# Patient Record
Sex: MALE | URBAN - NONMETROPOLITAN AREA
[De-identification: names, ages, dates, MRNs, and addresses within clinical notes are randomized per-mention and may not be internally consistent; named-entity substitution may affect disease eponyms.]

---

## 2024-09-09 PROCEDURE — 93306 TTE W/DOPPLER COMPLETE: CPT | Performed by: INTERNAL MEDICINE

## 2024-09-10 ENCOUNTER — DOCUMENTATION (OUTPATIENT)
Dept: CARDIOLOGY | Facility: CLINIC | Age: 69
End: 2024-09-10
Payer: MEDICARE

## 2024-09-11 ENCOUNTER — PATIENT OUTREACH (OUTPATIENT)
Dept: CARDIOLOGY | Facility: CLINIC | Age: 69
End: 2024-09-11
Payer: MEDICARE

## 2024-09-11 RX ORDER — NITROGLYCERIN 0.4 MG/1
0.4 TABLET SUBLINGUAL EVERY 5 MIN PRN
COMMUNITY

## 2024-09-11 RX ORDER — ATORVASTATIN CALCIUM 80 MG/1
80 TABLET, FILM COATED ORAL DAILY
COMMUNITY

## 2024-09-11 RX ORDER — ASPIRIN 81 MG/1
81 TABLET ORAL DAILY
COMMUNITY

## 2024-09-11 RX ORDER — CARVEDILOL 12.5 MG/1
12.5 TABLET ORAL 2 TIMES DAILY
COMMUNITY

## 2024-09-11 NOTE — PROGRESS NOTES
Discharge Facility: [facility]  Discharge Diagnosis:  STEMI  Admission Date: 9/8/24  Discharge Date:  9/10/24    PCP Appointment Date: Pt is establishing with a PCP  Specialist Appointment Date:   SEP 17  2024 02:30 PM - Cardiology Hospital Discharge  [facility] - Kana Restrepo DO     Hospital Encounter and Summary Linked: No  See discharge assessment below for further details     Engagement  Call Start Time: 1402 (9/11/2024  2:09 PM)    Medications  Prescription Comments: Brilinta 90mg BID, Atorvastatin 80mg Qpm, carvedilol 12.5mg BID, ASA 81mg81, NTG 0.4 (9/11/2024  2:09 PM)  Medication Comments: n/a (9/11/2024  2:09 PM)    Appointments  Does the patient have a primary care provider?: No (patient is currently working on establishing one and filling out paperwork) (9/11/2024  2:09 PM)    Self Management  What Durable Medical Equipment (DME) was ordered?: n/a (9/11/2024  2:09 PM)    Patient Teaching  Does the patient have access to their discharge instructions?: Yes (9/11/2024  2:09 PM)  Care Management Interventions: Reviewed instructions with patient (9/11/2024  2:09 PM)  What is the patient's perception of their health status since discharge?: Improving (9/11/2024  2:09 PM)  Is the patient/caregiver able to teach back the hierarchy of who to call/visit for symptoms/problems? PCP, Specialist, Home Health nurse, Urgent Care, ED, 911: Yes (9/11/2024  2:09 PM)  Patient/Caregiver Education Comments: Reviewed care and monitoring of groin site. REviewed importance of having a PCP, Reviewed new medications (9/11/2024  2:09 PM)    Wrap Up  Wrap Up Additional Comments: Patient denies any CP at this time. Reviewed new medications patient has an understanding of indication of medications. Patient states groin site is without drainage or increased redness, swelling or pain. REviewed upcoming appt with Dr Restrepo. This CM gives contact information for non urgent matters (9/11/2024  2:09 PM)

## 2024-09-17 ENCOUNTER — APPOINTMENT (OUTPATIENT)
Dept: CARDIOLOGY | Facility: CLINIC | Age: 69
End: 2024-09-17
Payer: MEDICARE

## 2024-09-18 ENCOUNTER — DOCUMENTATION (OUTPATIENT)
Dept: CARDIOLOGY | Facility: CLINIC | Age: 69
End: 2024-09-18
Payer: MEDICARE

## 2024-09-18 ENCOUNTER — TELEPHONE (OUTPATIENT)
Dept: CARDIOLOGY | Facility: CLINIC | Age: 69
End: 2024-09-18
Payer: MEDICARE

## 2024-09-18 DIAGNOSIS — Z98.890 S/P PERICARDIOCENTESIS: ICD-10-CM

## 2024-09-18 NOTE — TELEPHONE ENCOUNTER
"Patient seen on Cardiac Consult at [facility] on 9/16/2024; Reason for Consult: Hypotension, recent inferior STEMI, acute renal failure, cardiac tamponade.    ASSESSMENT:  Mr. Jones is a 68 year old male seen in interventional cardiology consultation at the request of hospitalist and patient who presented yesterday with shortness of breath, hypotension, questionable \"heart failure\" with normal BNP and normal chest x-ray with evidence of cardiomegaly on chest x-ray.    Patient became more hypotensive earlier today, there was concern for sepsis, he is transferred to the ICU and initiated on antibiotics, echocardiogram was performed revealing large circumferential pericardial effusion with RV collapse and moderately severe respiratory distress and hypotension.    Based on the above, emergent arrangements were made to proceed with emergent pericardiocentesis.    PLAN:  Proceed with emergent pericardiocentesis  Hold diuretics and losartan  Continue with nephrology consultation  Consider colchicine if nephrology approves    UPDATE: 9/18/2024  Mr. Jones is a 67 y/o M with pericardial effusion s/p pericardiocentesis on 9/16. High suspicion for Dressler syndrome. Patient's symptoms, DINORAH and blood pressure have all show significant improvement following pericardiocentesis. No additional output from pericardial drain in the past 24 hours, drain removed. On colchicine for pericarditis, will not start ibuprofen as patient is on antiplatelet therapy. Patient is appropriate for discharge today.    Plan:  1. Appropriate for discharge today from a cardiology standpoint  2. Continue Colchicine 0.6 mg daily for pericarditis  3. Continue other home medications as prescribed  4. Repeat Limited Echo prior to TCM follow up.    "

## 2024-09-19 ENCOUNTER — DOCUMENTATION (OUTPATIENT)
Dept: CARDIOLOGY | Facility: CLINIC | Age: 69
End: 2024-09-19
Payer: MEDICARE

## 2024-09-19 ENCOUNTER — PATIENT OUTREACH (OUTPATIENT)
Dept: CARDIOLOGY | Facility: CLINIC | Age: 69
End: 2024-09-19
Payer: MEDICARE

## 2024-09-19 RX ORDER — COLCHICINE 0.6 MG/1
0.6 TABLET ORAL DAILY
COMMUNITY

## 2024-09-19 NOTE — PROGRESS NOTES
Discharge Facility[facility]  [facility]  Discharge Diagnosis: Acute cardiac tamponade  Admission Date: 9/14/24  Discharge Date: 9/18/24    PCP Appointment Date: TBD  Specialist Appointment Date:   SEP 25  2024  10:20 AM - Cardiology Clinic Visit  [facility] - Kana Restrepo DO     Hospital Encounter and Summary Linked: No    Engagement  Call Start Time: 1027 (Patient returned call) (9/19/2024 10:45 AM)    Medications  Medications reviewed with patient/caregiver?: Yes (9/19/2024 10:45 AM)  Is the patient having any side effects they believe may be caused by any medication additions or changes?: No (9/19/2024 10:45 AM)  Does the patient have all medications ordered at discharge?: Yes (9/19/2024 10:45 AM)  Prescription Comments: Colchicine (9/19/2024 10:45 AM)  Is the patient taking all medications as directed (includes completed medication regime)?: Yes (9/19/2024 10:45 AM)  Medication Comments: Medication req not avaible (9/19/2024 10:45 AM)    Appointments  Does the patient have a primary care provider?: Yes (9/19/2024 10:45 AM)  Care Management Interventions: Advised patient to make appointment (9/19/2024 10:45 AM)  Care Management Interventions: Advised patient to keep appointment (9/19/2024 10:45 AM)    Self Management  What is the home health agency?: n/a (9/19/2024 10:45 AM)  What Durable Medical Equipment (DME) was ordered?: n/a (9/19/2024 10:45 AM)    Patient Teaching  Does the patient have access to their discharge instructions?: Yes (9/19/2024 10:45 AM)  Care Management Interventions: Reviewed instructions with patient (9/19/2024 10:45 AM)  What is the patient's perception of their health status since discharge?: Improving (9/19/2024 10:45 AM)  Is the patient/caregiver able to teach back the hierarchy of who to call/visit for symptoms/problems? PCP, Specialist, Home Health nurse, Urgent Care, ED, 911: Yes (9/19/2024 10:45 AM)  Patient/Caregiver Education Comments: Reivewed care of drain site. Reviewed  medication (9/19/2024 10:45 AM)    Wrap Up  Wrap Up Additional Comments: Patient denies CP or SOB. Patient states he can lay flat without difficulties. Reviewed medication colchicine and patient has an understanding of indication. Reviewed upcoming appt with Dr Meraz. Patient states he continues with dressing to drain site and will remove later today, but he denies any concerns with the site at this time. This CM gives contact information for non urgent matters. (9/19/2024 10:45 AM)

## 2024-09-24 ENCOUNTER — HOSPITAL ENCOUNTER (OUTPATIENT)
Dept: CARDIOLOGY | Facility: CLINIC | Age: 69
Discharge: HOME | End: 2024-09-24
Payer: MEDICARE

## 2024-09-24 DIAGNOSIS — Z98.890 S/P PERICARDIOCENTESIS: ICD-10-CM

## 2024-09-24 LAB
EJECTION FRACTION APICAL 4 CHAMBER: 22.2
EJECTION FRACTION: 40 %
LEFT VENTRICLE INTERNAL DIMENSION DIASTOLE: 6.62 CM (ref 3.5–6)
LEFT VENTRICULAR OUTFLOW TRACT DIAMETER: 2.7 CM
LV EJECTION FRACTION BIPLANE: 27 %

## 2024-09-24 PROCEDURE — 93308 TTE F-UP OR LMTD: CPT

## 2024-09-24 PROCEDURE — 93308 TTE F-UP OR LMTD: CPT | Performed by: INTERNAL MEDICINE

## 2024-09-25 ENCOUNTER — APPOINTMENT (OUTPATIENT)
Dept: CARDIOLOGY | Facility: CLINIC | Age: 69
End: 2024-09-25
Payer: MEDICARE

## 2024-09-25 VITALS
SYSTOLIC BLOOD PRESSURE: 116 MMHG | HEART RATE: 84 BPM | HEIGHT: 68 IN | WEIGHT: 232 LBS | BODY MASS INDEX: 35.16 KG/M2 | DIASTOLIC BLOOD PRESSURE: 68 MMHG

## 2024-09-25 DIAGNOSIS — I31.39 PERICARDIAL EFFUSION (HHS-HCC): ICD-10-CM

## 2024-09-25 DIAGNOSIS — I25.41 ANEURYSM (ARTERIOVENOUS) OF CORONARY VESSELS: ICD-10-CM

## 2024-09-25 DIAGNOSIS — I25.10 ASHD (ARTERIOSCLEROTIC HEART DISEASE): ICD-10-CM

## 2024-09-25 DIAGNOSIS — E78.5 HYPERLIPIDEMIA, UNSPECIFIED HYPERLIPIDEMIA TYPE: ICD-10-CM

## 2024-09-25 DIAGNOSIS — I21.19 INFERIOR MYOCARDIAL INFARCTION (MULTI): ICD-10-CM

## 2024-09-25 DIAGNOSIS — Z87.891 FORMER SMOKER: ICD-10-CM

## 2024-09-25 DIAGNOSIS — I10 ESSENTIAL HYPERTENSION: ICD-10-CM

## 2024-09-25 DIAGNOSIS — I25.5 CARDIOMYOPATHY, ISCHEMIC: ICD-10-CM

## 2024-09-25 PROCEDURE — 1159F MED LIST DOCD IN RCRD: CPT | Performed by: INTERNAL MEDICINE

## 2024-09-25 PROCEDURE — 3078F DIAST BP <80 MM HG: CPT | Performed by: INTERNAL MEDICINE

## 2024-09-25 PROCEDURE — 3074F SYST BP LT 130 MM HG: CPT | Performed by: INTERNAL MEDICINE

## 2024-09-25 PROCEDURE — 1160F RVW MEDS BY RX/DR IN RCRD: CPT | Performed by: INTERNAL MEDICINE

## 2024-09-25 PROCEDURE — 99496 TRANSJ CARE MGMT HIGH F2F 7D: CPT | Performed by: INTERNAL MEDICINE

## 2024-09-25 RX ORDER — CLOPIDOGREL BISULFATE 75 MG/1
75 TABLET ORAL DAILY
COMMUNITY

## 2024-09-25 RX ORDER — LOSARTAN POTASSIUM 25 MG/1
12.5 TABLET ORAL DAILY
Qty: 45 TABLET | Refills: 3 | Status: SHIPPED | OUTPATIENT
Start: 2024-09-25 | End: 2025-09-25

## 2024-09-25 RX ORDER — METOPROLOL SUCCINATE 50 MG/1
50 TABLET, EXTENDED RELEASE ORAL DAILY
COMMUNITY

## 2024-09-25 NOTE — PATIENT INSTRUCTIONS
Please bring all medicines, vitamins, and herbal supplements with you when you come to the office.    Prescriptions will not be filled unless you are compliant with your follow up appointments or have a follow up appointment scheduled as per instruction of your physician. Refills should be requested at the time of your visit.     BMI was above normal measurement. Current weight: 105 kg (232 lb)  Weight change since last visit (-) denotes wt loss 232 lbs   Weight loss needed to achieve BMI 25: 67.9 Lbs  Weight loss needed to achieve BMI 30: 35.1 Lbs  Provided instructions on dietary changes  Provided instructions on exercise.    Get cardiac MRI    Start losartan 12.5

## 2024-09-25 NOTE — PROGRESS NOTES
"Subjective   Michael Jones is a 68 y.o. male       Chief Complaint    Follow-up          68-year-old gentleman returns for 7-day TCM follow-up visit following recent large inferior ST elevation MI treated with primary percutaneous intervention of the mid through distal RCA with 2 large 4 and 4.5 mm drug-eluting stents with moderate LV dysfunction.  This occurred on September 8 and then 1 week later was readmitted with cardiac tamponade and hypotension.  He underwent emergent pericardiocentesis, draining a total of 850 cc of bloody pericardial fluid with complete resolution.  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    He remains on appropriate therapies including colchicine which will be discontinued this coming weekend (for presumptive Dressler syndrome)    He denies any hypotension, angina, shortness of breath he has discontinued smoking altogether; his wife even states his breathing is greatly improved.    We reviewed his follow-up echocardiogram, all interventional reports and medications.    Recommendations, proceed with cardiac MR imaging to assess inferior wall and aneurysm and ordered for further decision making process in regards to possible referral to cardiothoracic surgery versus continued medical management, will add losartan 12.5 mg daily to his regimen, follow-up in the next 12 weeks         Review of Systems   All other systems reviewed and are negative.           Vitals:    09/25/24 1037   BP: 116/68   BP Location: Left arm   Patient Position: Sitting   Pulse: 84   Weight: 105 kg (232 lb)   Height: 1.727 m (5' 8\")        Objective   Physical Exam  Constitutional:       Appearance: Normal appearance.   HENT:      Nose: Nose normal.   Neck:      Vascular: No carotid bruit.   Cardiovascular:      Rate and Rhythm: Normal rate.      Pulses: Normal " pulses.      Heart sounds: Normal heart sounds.   Pulmonary:      Effort: Pulmonary effort is normal.   Abdominal:      General: Bowel sounds are normal.      Palpations: Abdomen is soft.   Musculoskeletal:         General: Normal range of motion.      Cervical back: Normal range of motion.      Right lower leg: No edema.      Left lower leg: No edema.   Skin:     General: Skin is warm and dry.   Neurological:      General: No focal deficit present.      Mental Status: He is alert.   Psychiatric:         Mood and Affect: Mood normal.         Behavior: Behavior normal.         Thought Content: Thought content normal.         Judgment: Judgment normal.         Allergies  Penicillins     Current Medications    Current Outpatient Medications:     aspirin 81 mg EC tablet, Take 1 tablet (81 mg) by mouth once daily., Disp: , Rfl:     atorvastatin (Lipitor) 80 mg tablet, Take 1 tablet (80 mg) by mouth once daily., Disp: , Rfl:     clopidogrel (Plavix) 75 mg tablet, Take 1 tablet (75 mg) by mouth once daily., Disp: , Rfl:     colchicine 0.6 mg tablet, Take 1 tablet (0.6 mg) by mouth once daily., Disp: , Rfl:     metoprolol succinate XL (Toprol-XL) 50 mg 24 hr tablet, Take 1 tablet (50 mg) by mouth once daily. Do not crush or chew., Disp: , Rfl:     nitroglycerin (Nitrostat) 0.4 mg SL tablet, Place 1 tablet (0.4 mg) under the tongue every 5 minutes if needed for chest pain., Disp: , Rfl:                      Assessment/Plan   1. ASHD (arteriosclerotic heart disease)        2. Cardiomyopathy, ischemic        3. Inferior myocardial infarction (Multi)        4. Aneurysm (arteriovenous) of coronary vessels        5. Hyperlipidemia, unspecified hyperlipidemia type        6. BMI 35.0-35.9,adult        7. Former smoker        8. Pericardial effusion ([facility])                 Scribe Attestation  By signing my name below, Rayna MCKEON RN   , Scribe   attest that this documentation has been prepared under the direction and in the  presence of Kana Restrepo DO.     Provider Attestation - Scribe documentation    All medical record entries made by the Scribe were at my direction and personally dictated by me. I have reviewed the chart and agree that the record accurately reflects my personal performance of the history, physical exam, discussion and plan.

## 2024-09-25 NOTE — LETTER
September 26, 2024     Mehul Torres DO  [address]  [place] [address]    Patient: Michael Jones   YOB: 1955   Date of Visit: 9/25/2024       Dear Dr. Mehul Torres DO:    Thank you for referring Michael Jones to me for evaluation. Below are my notes for this consultation.  If you have questions, please do not hesitate to call me. I look forward to following your patient along with you.       Sincerely,     Kana Restrepo DO      CC: No Recipients  ______________________________________________________________________________________    Subjective   Michael Jones is a 68 y.o. male       Chief Complaint    Follow-up          68-year-old gentleman returns for 7-day TCM follow-up visit following recent large inferior ST elevation MI treated with primary percutaneous intervention of the mid through distal RCA with 2 large 4 and 4.5 mm drug-eluting stents with moderate LV dysfunction.  This occurred on September 8 and then 1 week later was readmitted with cardiac tamponade and hypotension.  He underwent emergent pericardiocentesis, draining a total of 850 cc of bloody pericardial fluid with complete resolution.  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    He remains on appropriate therapies including colchicine which will be discontinued this coming weekend (for presumptive Dressler syndrome)    He denies any hypotension, angina, shortness of breath he has discontinued smoking altogether; his wife even states his breathing is greatly improved.    We reviewed his follow-up echocardiogram, all interventional reports and medications.    Recommendations, proceed with cardiac MR imaging to assess inferior wall and aneurysm and ordered for further decision making process in regards to possible referral to cardiothoracic surgery  "versus continued medical management, will add losartan 12.5 mg daily to his regimen, follow-up in the next 12 weeks         Review of Systems   All other systems reviewed and are negative.           Vitals:    09/25/24 1037   BP: 116/68   BP Location: Left arm   Patient Position: Sitting   Pulse: 84   Weight: 105 kg (232 lb)   Height: 1.727 m (5' 8\")        Objective   Physical Exam  Constitutional:       Appearance: Normal appearance.   HENT:      Nose: Nose normal.   Neck:      Vascular: No carotid bruit.   Cardiovascular:      Rate and Rhythm: Normal rate.      Pulses: Normal pulses.      Heart sounds: Normal heart sounds.   Pulmonary:      Effort: Pulmonary effort is normal.   Abdominal:      General: Bowel sounds are normal.      Palpations: Abdomen is soft.   Musculoskeletal:         General: Normal range of motion.      Cervical back: Normal range of motion.      Right lower leg: No edema.      Left lower leg: No edema.   Skin:     General: Skin is warm and dry.   Neurological:      General: No focal deficit present.      Mental Status: He is alert.   Psychiatric:         Mood and Affect: Mood normal.         Behavior: Behavior normal.         Thought Content: Thought content normal.         Judgment: Judgment normal.         Allergies  Penicillins     Current Medications    Current Outpatient Medications:   •  aspirin 81 mg EC tablet, Take 1 tablet (81 mg) by mouth once daily., Disp: , Rfl:   •  atorvastatin (Lipitor) 80 mg tablet, Take 1 tablet (80 mg) by mouth once daily., Disp: , Rfl:   •  clopidogrel (Plavix) 75 mg tablet, Take 1 tablet (75 mg) by mouth once daily., Disp: , Rfl:   •  colchicine 0.6 mg tablet, Take 1 tablet (0.6 mg) by mouth once daily., Disp: , Rfl:   •  metoprolol succinate XL (Toprol-XL) 50 mg 24 hr tablet, Take 1 tablet (50 mg) by mouth once daily. Do not crush or chew., Disp: , Rfl:   •  nitroglycerin (Nitrostat) 0.4 mg SL tablet, Place 1 tablet (0.4 mg) under the tongue every 5 " minutes if needed for chest pain., Disp: , Rfl:                      Assessment/Plan   1. ASHD (arteriosclerotic heart disease)        2. Cardiomyopathy, ischemic        3. Inferior myocardial infarction (Multi)        4. Aneurysm (arteriovenous) of coronary vessels        5. Hyperlipidemia, unspecified hyperlipidemia type        6. BMI 35.0-35.9,adult        7. Former smoker        8. Pericardial effusion ([facility])                 Scribe Attestation  By signing my name below, I, Rayna VALENZUELA RN   , Scribe   attest that this documentation has been prepared under the direction and in the presence of Kana Restrepo DO.     Provider Attestation - Scribe documentation    All medical record entries made by the Scribe were at my direction and personally dictated by me. I have reviewed the chart and agree that the record accurately reflects my personal performance of the history, physical exam, discussion and plan.

## 2024-10-08 ENCOUNTER — PATIENT OUTREACH (OUTPATIENT)
Dept: CARDIOLOGY | Facility: CLINIC | Age: 69
End: 2024-10-08
Payer: MEDICARE

## 2024-10-22 ENCOUNTER — HOSPITAL ENCOUNTER (OUTPATIENT)
Dept: RADIOLOGY | Facility: CLINIC | Age: 69
Discharge: HOME | End: 2024-10-22
Payer: MEDICARE

## 2024-10-22 DIAGNOSIS — I25.41 ANEURYSM (ARTERIOVENOUS) OF CORONARY VESSELS: ICD-10-CM

## 2024-10-22 DIAGNOSIS — I21.19 INFERIOR MYOCARDIAL INFARCTION (MULTI): ICD-10-CM

## 2024-10-22 DIAGNOSIS — I25.5 CARDIOMYOPATHY, ISCHEMIC: ICD-10-CM

## 2024-10-22 PROCEDURE — 75561 CARDIAC MRI FOR MORPH W/DYE: CPT

## 2024-10-22 PROCEDURE — 2550000001 HC RX 255 CONTRASTS: Performed by: INTERNAL MEDICINE

## 2024-10-22 PROCEDURE — A9575 INJ GADOTERATE MEGLUMI 0.1ML: HCPCS | Performed by: INTERNAL MEDICINE

## 2024-10-22 PROCEDURE — 75565 CARD MRI VELOC FLOW MAPPING: CPT

## 2024-10-22 RX ORDER — GADOTERATE MEGLUMINE 376.9 MG/ML
40 INJECTION INTRAVENOUS
Status: COMPLETED | OUTPATIENT
Start: 2024-10-22 | End: 2024-10-22

## 2024-11-05 ENCOUNTER — TELEPHONE (OUTPATIENT)
Dept: CARDIOLOGY | Facility: CLINIC | Age: 69
End: 2024-11-05
Payer: MEDICARE

## 2024-11-05 DIAGNOSIS — I31.39 PERICARDIAL EFFUSION (HHS-HCC): ICD-10-CM

## 2024-11-05 DIAGNOSIS — I25.41 ANEURYSM (ARTERIOVENOUS) OF CORONARY VESSELS: ICD-10-CM

## 2024-11-05 DIAGNOSIS — I21.19 INFERIOR MYOCARDIAL INFARCTION (MULTI): ICD-10-CM

## 2024-11-05 NOTE — TELEPHONE ENCOUNTER
Result Communication    Resulted Orders   MR cardiac morphology and function w and wo IV contrast    Narrative    Interpreted By:  Elinor Wright  and Shiva Cohen   STUDY:  MR CARDIAC MORPHOLOGY AND FUNCTION W AND WO IV CONTRAST;  10/22/2024  9:56 am      INDICATION:  Signs/Symptoms:post mi aneurysm.      COMPARISON:  None.      ACCESSION NUMBER(S):  [id number]      ORDERING CLINICIAN:  MEHRDAD WERNER      TECHNIQUE:  Siemens 1.5  Ramona MRI scanner.  Turbo spin echo and balanced steady state free precession (bSSFP)  imaging for anatomic definition. Dynamic cine bSSFP for cardiac  chamber and wall-motion analysis, and valvular analysis. Flow  quantification sequences for hemodynamics. Delayed gadolinium  enhancement analysis after injection of gadolinium-chelate (Dotarem,  0.2 mmol/kg).      HT- 173; WT-1007; BSA- 2.27 m2      FINDINGS:  CARDIAC CHAMBERS  Normal atrioventricular and ventriculoarterial concordance      LEFT ATRIUM  Normal size (JOSE-28.5 ml/m2).      RIGHT ATRIUM  Normal size (RA max 4ch - 25.2 cm2)      INTERATRIAL SEPTUM  Intact.      LEFT VENTRICLE:  1. Mildly dilated LV (101 ml/m2) with moderately reduced systolic  function (LVEF 32%).  2.. The basal/mid inferolateral wall is thin/dyskinetic and  aneurysmal (4 cm neck).  3. Normal LV septal wall thickness and normal LV indexed mass.  4. No evidence of LV thrombus.  5. Delayed-enhancement imaging reveals transmural infarct (%)  involving the basal/mid inferolateral wall without residual  viability. MEASUREMENTS  ----------------------------------------------------------------------  ------------------ VOLUMETRIC ANALYSIS  ----------------------------------------------  .---------------------------------------------------------.                   LV    Reference   RV    Reference   +------+----------+------+------------+------+------------+   EDV   ml         229   (109-191)   104   (105-205)    ESV   ml         155    (27-72)      51   (20-80)     CO    L/min     6.74              5.24                MASS  g          176   (107-183)                 SV    ml          74   ()    58   ()    EF    %           32   (58-76)     51   (55-81)     '------+----------+------+------------+------+------------'      CARDIAC OUTPUT HR:  91 bpm  LV DIMENSIONS  ----------------------------------------------  WALL THICKNESS - ANTEROSEPTAL:  1.03 cm  WALL THICKNESS - INFEROLATERAL:  1.04 cm  LV LIOR:  6.1 cm  LV ESD:  4.91 cm      LA DIMENSIONS (LV SYSTOLE)  ----------------------------------------------  DIAMETER:  3.8 cm  AREA - 2 CHAMBER:  21.26 cmï¿½  LENGTH - 2 CHAMBER:  6.2 cm  AREA - 4 CHAMBER:  21.26 cmï¿½  LENGTH - 4 CHAMBER:  6.5 cm  VOLUME:  62 ml  VOLUME NORMALIZED:  28.5 ml/mï¿½      RA DIMENSIONS (RV SYSTOLE)  ----------------------------------------------  DIAMETER:  5.31 cm  AREA - 4 CHAMBER:  25.2 cmï¿½  LENGTH - 4 CHAMBER:  4.86 cm      AORTIC ROOT DIMENSIONS  ----------------------------------------------  ANNULUS:  3.2 cm  SINUS OF VALSALVA:  4.1 cm  SINOTUBULAR JUNCTION:  3.4 cm          17 SEGMENT  ----------------------------------------------------------------------  ------------------  .---------------------------------------------------------------------  ---------------------.  Segments            Wall Motion    Hyperenhancement  Stress Perfusion  Interpretation   +--------------------+--------------+------------------+--------------  ----+----------------+  Base Anterior                                                          Base Anteroseptal                                                        Base Inferoseptal                                                      Base Inferior                                                          Base Inferolateral   Akinetic      %                              Transmural MI    Base Anterolateral   Akinetic       %                             Transmural MI    Mid  Anterior                                                           Mid Anteroseptal                                                       Mid Inferoseptal                                                        Mid Inferior                                                           Mid Inferolateral   Akinetic       %                             Transmural MI    Mid  Anterolateral   Akinetic      %             Transmural MI    Apical Anterior                                                        Apical Septal                                                        Apical Inferior                                                        Apical Lateral                                                         [place]                                                        +--------------------+--------------+------------------+--------------  ----+----------------+  RV Segments         Wall Motion    Hyperenhancement  Stress Perfusion  Interpretation   +--------------------+--------------+------------------+--------------  ----+----------------+  RV Basal Anterior   Normal/Hyper                                       RV Basal Inferior   Normal/Hyper                                         RV Mid              Normal/Hyper                                       RV Apical           Normal/Hyper                                       '--------------------+--------------+------------------+--------------  ----+----------------'      FINDINGS  ----------------------------------------------  LV SCAR SIZE (17 SEGMENT):  21 %      RIGHT VENTRICLE  Normal RV size (EDVi-46 ml/m2) and systolic function (RVEF 51%). No  segmental wall motion abnormalities. No abnormal delayed enhancement  in the myocardium.      INTERVENTRICULAR SEPTUM  Intact.      AORTIC  VALVE  Tricuspid aortic valve with normal leaflet excursion.  There is  mild aortic regurgitation.  Flow quantification through the ascending aorta:  Forward volume =58 cc/beat  Reverse volume = 10 cc/beat  Net forward volume = 48 cc/beat  Aortic regurgitant fraction = 17 %      MITRAL VALVE  There is  trivial mitral regurgitation.  Integrating LV volumetric and aortic flow quantification data reveals:  Quantitative mitral regurgitant volume = 5 cc/beat          TRICUSPID VALVE  There is qualitative trivial tricuspid regurgitation.      PULMONARY VALVE:  Not assessed.      PERICARDIUM:  The pericardium is normal. Trivial pericardial effusion adjacent to  the basal and apical lateral wall (no contrast enhancement).      THORACIC AORTA  The thoracic aorta appears normal in course, caliber, and contour.  There is no evidence for acute aortic pathology. The arch vessel  branching pattern is  normal.   All the arch branch vessels appear  widely patent in their proximal portions.      AORTIC ROOT DIMENSIONS:  Aortic root(sinus of valsalva): 4.1 cm  Annulus: 3.48 cm  Sinotubular junction:3.4 cm      PULMONARY ARTERIES  The central pulmonary arteries appear  normal (MPA-2.5 cm, RPA- 2.5  cm, LPA-2.3 cm).      SYSTEMIC AND PULMONARY VEINS  Normal systemic venous and pulmonary venous return.  The SVC is of normal caliber. IVC appears normal  Normal pulmonary venous anatomy.      CHEST  The chest wall is normal.  Limited imaging through the lungs reveals no gross abnormalities. No  pleural effusion.      UPPER ABDOMEN  There is a 5 cm lesion with a smooth border in the right lobe of the  liver on HASTE imaging, probable hepatic cyst.        Impression    1. Mildly dilated LV (101 ml/m2) with moderately reduced systolic  function (LVEF 32%).  2. The basal/mid inferolateral wall is thin/dyskinetic and aneurysmal.  3. Transmural infarction of the basal/mid inferolateral wall without  residual viability. Subendocardial infarction  of the apical lateral  wall (50% wall thickness).  4. Normal RV size (EDVi-46 ml/m2) and systolic function (RVEF 51%).  5. Mild aortic regurgitation (RF 17%).  6. Mildly dilated aortic root measuring 4.1 cm.                      MACRO:  None      Signed by: Elinor Wright 10/28/2024 5:24 AM  Dictation workstation:   [id number]

## 2024-11-05 NOTE — TELEPHONE ENCOUNTER
Patient phoned results discussed verbalized understanding. Referral for Dr.El Solis placed in chart. Once orders are signed to [facility]er[facility] for scheduling.

## 2024-11-05 NOTE — TELEPHONE ENCOUNTER
----- Message from Kana Restrepo sent at 11/1/2024  4:34 PM EDT -----  Please refer to Dr. Martin Solis CT surgery at [facility], for consideration of aneurysmectomy of the inferior myocardium, status post inferior MI with primary revascularization of the right coronary, and 1 week later presented with cardiac tamponade with subsequent successful pericardiocentesis and evidence of inferodiaphragmatic aneurysm    Dear Souleymane, if you would'nt mind reviewing this gentlemen's chart, Cath Lab and interventions.  Please see brief description above.  Thank you, FERMÍN Restrepo

## 2024-11-06 ENCOUNTER — PATIENT OUTREACH (OUTPATIENT)
Dept: CARDIOLOGY | Facility: CLINIC | Age: 69
End: 2024-11-06
Payer: MEDICARE

## 2024-12-05 ENCOUNTER — PATIENT OUTREACH (OUTPATIENT)
Dept: CARDIOLOGY | Facility: CLINIC | Age: 69
End: 2024-12-05
Payer: MEDICARE

## 2024-12-10 NOTE — PROGRESS NOTES
[facility]    Referred by Dr. Lauro DO for Aneurysm Evaluation      Chief Complaint:  Cardiac disease assessment    HPI:  Mr. Michael Jones is an 69 y.o. male, who was referred by Kana Lauren to evaluate  aneurysm of left ventricle.    Michael Jones has PMH significant for recent large inferior ST elevation MI treated with PCI of the mid through distal RCA with 2 drug-eluting stents with moderate LV dysfunction on 9/8/24. Patient readmitted 1 week later with with cardiac tamponade and hypotension. He underwent emergent pericardiocentesis.  Follow-up echocardiogram revealed completely resolved pericardial effusion with persistent inferior akinesis and evidence of inferoposterior aneurysmal segment with ejection fraction of 40%.       No past medical history on file.      Pertinent Diagnostics:  TTE (9/24/24)  1. The left ventricular systolic function is moderately decreased, with a visually estimated ejection fraction of 40%.   2. Left ventricular cavity size is moderately dilated.   3. Severe hypokinesis of the inferolateral wall and inferoseptal wall with aneurysmal dilatation in the mid posterior wall.   4. There is normal right ventricular global systolic function.   5. No pericardial effusion is noted.   6. When compared to study from 9/16/2024, the large pericardial effusion has resolved.    Cardiac MRI (10/22/24)  1. Mildly dilated LV (101 ml/m2) with moderately reduced systolic  function (LVEF 32%).  2. The basal/mid inferolateral wall is thin/dyskinetic and aneurysmal.  3. Transmural infarction of the basal/mid inferolateral wall without  residual viability. Subendocardial infarction of the apical lateral  wall (50% wall thickness).  4. Normal RV size (EDVi-46 ml/m2) and systolic function (RVEF 51%).  5. Mild aortic regurgitation (RF 17%).  6. Mildly dilated aortic root measuring 4.1 cm.    Cardiac Catheterization (9/8/24 at [facility])    CT (pending)    Dental  (pending)    Impression/Plan:  Mr. Michael Jones is an 69 y.o. 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 Patient sustained a large inferior wall MI, complicated by large pericardial effusion and was found an inferior wall aneurysm. Patient would benefit from having it repaired.   We have discussed the surgical repair - he wants to proceed.   We will completed the work up and schedule the operation in January of 2025.   I will speak to Dr. Restrepo in this regard as well.      ____________________________________________________________  Souleymane Blunt MD  Associate Professor of Surgery  Senior Attending Cardiac Surgeon  Division of Cardiac Surgery    [facility]  [facility]

## 2024-12-13 ENCOUNTER — TELEMEDICINE (OUTPATIENT)
Dept: CARDIAC SURGERY | Facility: HOSPITAL | Age: 69
End: 2024-12-13
Payer: MEDICARE

## 2024-12-13 DIAGNOSIS — R07.9 CHEST PAIN, UNSPECIFIED TYPE: ICD-10-CM

## 2024-12-13 DIAGNOSIS — I25.41 ANEURYSM (ARTERIOVENOUS) OF CORONARY VESSELS: ICD-10-CM

## 2024-12-13 DIAGNOSIS — R82.90 ABNORMAL URINE FINDING: ICD-10-CM

## 2024-12-13 DIAGNOSIS — I25.3 LEFT VENTRICULAR ANEURYSM: Primary | ICD-10-CM

## 2024-12-13 DIAGNOSIS — I25.3 ANEURYSM OF LEFT VENTRICLE OF HEART: ICD-10-CM

## 2024-12-13 DIAGNOSIS — Z87.891 FORMER SMOKER: ICD-10-CM

## 2024-12-13 DIAGNOSIS — R09.89 CHEST PAIN WITH HEMODYNAMIC INSTABILITY: ICD-10-CM

## 2024-12-13 DIAGNOSIS — R07.9 CHEST PAIN WITH HEMODYNAMIC INSTABILITY: ICD-10-CM

## 2024-12-13 PROCEDURE — 99204 OFFICE O/P NEW MOD 45 MIN: CPT | Performed by: THORACIC SURGERY (CARDIOTHORACIC VASCULAR SURGERY)

## 2024-12-16 RX ORDER — ALBUTEROL SULFATE 90 UG/1
1 INHALANT RESPIRATORY (INHALATION) ONCE
OUTPATIENT
Start: 2024-12-16

## 2024-12-16 RX ORDER — ALBUTEROL SULFATE 0.83 MG/ML
3 SOLUTION RESPIRATORY (INHALATION) ONCE
OUTPATIENT
Start: 2024-12-16

## 2025-01-08 ENCOUNTER — APPOINTMENT (OUTPATIENT)
Dept: CARDIOLOGY | Facility: CLINIC | Age: 70
End: 2025-01-08
Payer: MEDICARE

## 2025-01-08 VITALS
HEIGHT: 68 IN | DIASTOLIC BLOOD PRESSURE: 80 MMHG | BODY MASS INDEX: 36.98 KG/M2 | SYSTOLIC BLOOD PRESSURE: 130 MMHG | HEART RATE: 64 BPM | WEIGHT: 244 LBS

## 2025-01-08 DIAGNOSIS — E78.2 MIXED HYPERLIPIDEMIA: ICD-10-CM

## 2025-01-08 DIAGNOSIS — I31.39 PERICARDIAL EFFUSION (HHS-HCC): ICD-10-CM

## 2025-01-08 DIAGNOSIS — Z98.61 S/P PTCA (PERCUTANEOUS TRANSLUMINAL CORONARY ANGIOPLASTY): ICD-10-CM

## 2025-01-08 DIAGNOSIS — I25.41 ANEURYSM (ARTERIOVENOUS) OF CORONARY VESSELS: ICD-10-CM

## 2025-01-08 DIAGNOSIS — I25.10 ASHD (ARTERIOSCLEROTIC HEART DISEASE): ICD-10-CM

## 2025-01-08 DIAGNOSIS — I25.5 CARDIOMYOPATHY, ISCHEMIC: ICD-10-CM

## 2025-01-08 DIAGNOSIS — Z87.891 FORMER SMOKER: ICD-10-CM

## 2025-01-08 DIAGNOSIS — I21.19 INFERIOR MYOCARDIAL INFARCTION (MULTI): ICD-10-CM

## 2025-01-08 PROCEDURE — 1036F TOBACCO NON-USER: CPT | Performed by: INTERNAL MEDICINE

## 2025-01-08 PROCEDURE — 1160F RVW MEDS BY RX/DR IN RCRD: CPT | Performed by: INTERNAL MEDICINE

## 2025-01-08 PROCEDURE — 1159F MED LIST DOCD IN RCRD: CPT | Performed by: INTERNAL MEDICINE

## 2025-01-08 PROCEDURE — 3008F BODY MASS INDEX DOCD: CPT | Performed by: INTERNAL MEDICINE

## 2025-01-08 PROCEDURE — 99215 OFFICE O/P EST HI 40 MIN: CPT | Performed by: INTERNAL MEDICINE

## 2025-01-08 RX ORDER — DAPAGLIFLOZIN 10 MG/1
10 TABLET, FILM COATED ORAL DAILY
Qty: 90 TABLET | Refills: 3 | Status: SHIPPED | OUTPATIENT
Start: 2025-01-08 | End: 2026-01-08

## 2025-01-08 ASSESSMENT — ENCOUNTER SYMPTOMS: SHORTNESS OF BREATH: 1

## 2025-01-08 NOTE — PATIENT INSTRUCTIONS
Please bring all medicines, vitamins, and herbal supplements with you when you come to the office.    Prescriptions will not be filled unless you are compliant with your follow up appointments or have a follow up appointment scheduled as per instruction of your physician. Refills should be requested at the time of your visit.     BMI was above normal measurement. Current weight: 111 kg (244 lb)  Weight change since last visit (-) denotes wt loss 9 lbs   Weight loss needed to achieve BMI 25: 78.7 Lbs  Weight loss needed to achieve BMI 30: 45.7 Lbs  Provided instructions on dietary changes.

## 2025-01-08 NOTE — PROGRESS NOTES
Subjective   Michael Jones is a 69 y.o. male       Chief Complaint    Follow-up          69-year-old gentleman returns for follow-up status post large inferior ST elevation MI with revascularization of the RCA x 3 drug-eluting stents in September 2024.  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  Because there was significant residual inferobasilar aneurysmal disease there was concern about post MI complication/contained rupture and therefore proceeded with further imaging.  MRI imaging reveals persistent severe LV dysfunction with ejection fraction of 32%,, 100% transmural infarction of the inferior basilar segment and nontransmural infarction of the inferoapical segment on the order of 50%.    Patient saw Dr. Martin Solis of cardiothoracic surgery in consultation who then discussed with me possible benefits of aneurysmectomy and the need for limited repeat cath to assure no further revascularization is necessary.  He also has poor dentition and will require removal of 10 teeth, has consultation with oral surgeon on January 28.    Is now been 4 months since his MI and revascularization.  He has had no recurrent angina, heart failure or recurrent hospitalizations fortunately.    Recommendations: Proceed with limited left heart catheterization, oral surgery consultation with teeth extraction for poor dentition prior to open heart surgery for aneurysmectomy electively.  Risks, benefits alternatives informed decision-making process performed extensively with patient and wife for 30 minutes today including drawing out anatomy and the potential benefits but also risks of surgical intervention.         Review of Systems   Cardiovascular:  Positive for chest pain.   Respiratory:  Positive for shortness of breath.    All other systems reviewed and are negative.           Vitals:    01/08/25 1138   BP: 130/80   BP  "Location: Left arm   Patient Position: Sitting   Pulse: 64   Weight: 111 kg (244 lb)   Height: 1.734 m (5' 8.25\")        Objective   Physical Exam  Constitutional:       Appearance: Normal appearance.   HENT:      Nose: Nose normal.   Neck:      Vascular: No carotid bruit.   Cardiovascular:      Rate and Rhythm: Normal rate.      Pulses: Normal pulses.      Heart sounds: Normal heart sounds.   Pulmonary:      Effort: Pulmonary effort is normal.   Abdominal:      General: Bowel sounds are normal.      Palpations: Abdomen is soft.   Musculoskeletal:         General: Normal range of motion.      Cervical back: Normal range of motion.      Right lower leg: No edema.      Left lower leg: No edema.   Skin:     General: Skin is warm and dry.   Neurological:      General: No focal deficit present.      Mental Status: He is alert.   Psychiatric:         Mood and Affect: Mood normal.         Behavior: Behavior normal.         Thought Content: Thought content normal.         Judgment: Judgment normal.         Allergies  Penicillins     Current Medications    Current Outpatient Medications:     aspirin 81 mg EC tablet, Take 1 tablet (81 mg) by mouth once daily., Disp: , Rfl:     atorvastatin (Lipitor) 80 mg tablet, Take 1 tablet (80 mg) by mouth once daily., Disp: , Rfl:     clopidogrel (Plavix) 75 mg tablet, Take 1 tablet (75 mg) by mouth once daily., Disp: , Rfl:     losartan (Cozaar) 25 mg tablet, Take 0.5 tablets (12.5 mg) by mouth once daily., Disp: 45 tablet, Rfl: 3    metoprolol succinate XL (Toprol-XL) 50 mg 24 hr tablet, Take 1 tablet (50 mg) by mouth once daily. Do not crush or chew., Disp: , Rfl:     nitroglycerin (Nitrostat) 0.4 mg SL tablet, Place 1 tablet (0.4 mg) under the tongue every 5 minutes if needed for chest pain., Disp: , Rfl:                      Assessment/Plan   1. ASHD (arteriosclerotic heart disease)  Follow Up In Cardiology      2. S/P PTCA (percutaneous transluminal coronary angioplasty)        3. " Inferior myocardial infarction (Multi)        4. Pericardial effusion (HHS-HCC)        5. Aneurysm (arteriovenous) of coronary vessels        6. Cardiomyopathy, ischemic        7. Mixed hyperlipidemia        8. BMI 36.0-36.9,adult        9. Former smoker                 Scribe Attestation  By signing my name below, Bibi MCKEON LPN  , Scribe   attest that this documentation has been prepared under the direction and in the presence of Kana Restrepo DO.     Provider Attestation - Scribe documentation    All medical record entries made by the Scribe were at my direction and personally dictated by me. I have reviewed the chart and agree that the record accurately reflects my personal performance of the history, physical exam, discussion and plan.

## 2025-01-09 ENCOUNTER — TELEPHONE (OUTPATIENT)
Dept: CARDIOLOGY | Facility: CLINIC | Age: 70
End: 2025-01-09
Payer: MEDICARE

## 2025-01-09 NOTE — TELEPHONE ENCOUNTER
Heart Cath [id number] DX: I25.10/Z98.61/I21.9/I31.39/I25.41/I25.5 does not require prior auth as the patient has Medicare primary.    done

## 2025-01-10 LAB
NON-UH HIE ANION GAP: NORMAL MEQ/L (ref 6–15)
NON-UH HIE BASOPHILS # (AUTO): ABNORMAL 10*3/UL (ref 0–0.2)
NON-UH HIE BASOPHILS % (AUTO): ABNORMAL %
NON-UH HIE BLOOD UREA NITROGEN: NORMAL MG/DL (ref 7–25)
NON-UH HIE CARBON DIOXIDE: NORMAL MMOL/L (ref 21–31)
NON-UH HIE CHLORIDE: NORMAL MMOL/L (ref 98–107)
NON-UH HIE CHOL/HDL RATIO: ABNORMAL
NON-UH HIE CHOLESTEROL: ABNORMAL MG/DL (ref 140–200)
NON-UH HIE CREATININE: NORMAL MG/DL (ref 0.7–1.3)
NON-UH HIE EOSINOPHILS # (AUTO): ABNORMAL 10*3/UL (ref 0–0.45)
NON-UH HIE EOSINOPHILS % (AUTO): ABNORMAL %
NON-UH HIE ESTIMATED GFR: >60 ML/MIN
NON-UH HIE HDL CHOLESTEROL: ABNORMAL MG/DL (ref 23–92)
NON-UH HIE HEMATOCRIT: ABNORMAL % (ref 38.8–50)
NON-UH HIE HEMOGLOBIN: ABNORMAL G/DL (ref 13–17)
NON-UH HIE INR: NORMAL
NON-UH HIE LDL CHOLESTEROL,CALCULATED: ABNORMAL MG/DL (ref 0–100)
NON-UH HIE LYMPHOCYTES # (AUTO): ABNORMAL 10*3/UL (ref 1–4.8)
NON-UH HIE LYMPHOCYTES % (AUTO): ABNORMAL %
NON-UH HIE MEAN CORPUSCULAR HEMOGLOBIN: ABNORMAL PG (ref 27.5–35.2)
NON-UH HIE MEAN CORPUSCULAR HGB CONC: ABNORMAL G/DL (ref 32.5–35.6)
NON-UH HIE MEAN CORPUSCULAR VOLUME: ABNORMAL FL (ref 83.5–101)
NON-UH HIE MEAN PLATELET VOLUME: ABNORMAL FL (ref 6.6–10.1)
NON-UH HIE MONOCYTES # (AUTO): ABNORMAL 10*3/UL (ref 0–0.8)
NON-UH HIE MONOCYTES % (AUTO): ABNORMAL %
NON-UH HIE NEUTROPHILS # (AUTO): ABNORMAL 10*3/UL (ref 1.8–7.7)
NON-UH HIE NEUTROPHILS % (AUTO): ABNORMAL %
NON-UH HIE NRBC%: ABNORMAL /100{WBC} (ref 0–0.5)
NON-UH HIE PARTIAL THROMBOPLASTIN TIME: NORMAL S (ref 25.1–36.5)
NON-UH HIE PLATELET COUNT: ABNORMAL 10*3/UL (ref 150–450)
NON-UH HIE POTASSIUM: NORMAL MMOL/L (ref 3.5–5.1)
NON-UH HIE PROTHROMBIN TIME: NORMAL S (ref 9–12.9)
NON-UH HIE RED BLOOD COUNT: ABNORMAL 10*6/UL (ref 3.9–5.6)
NON-UH HIE RED CELL DISTRIBUTION WIDTH: ABNORMAL % (ref 12–14.8)
NON-UH HIE SODIUM: NORMAL MMOL/L (ref 136–145)
NON-UH HIE TRIGLYCERIDE W/REFLEX: ABNORMAL MG/DL (ref 0–149)
NON-UH HIE UNCORRECTED WBC: ABNORMAL 10*3/UL (ref 4.1–10.5)
NON-UH HIE VLDL CHOLESTEROL: ABNORMAL MG/DL
NON-UH HIE WHITE BLOOD COUNT: ABNORMAL 10*3/UL (ref 4.1–10.5)

## 2025-01-13 ENCOUNTER — TELEPHONE (OUTPATIENT)
Dept: CARDIOLOGY | Facility: CLINIC | Age: 70
End: 2025-01-13
Payer: MEDICARE

## 2025-01-13 NOTE — TELEPHONE ENCOUNTER
Patient phoned in states tested positive for covid today and is feeling congested, sneezing, coughing. Patient needs to cancel and reschedule heart cath. Patient advised I will forward the information to our schedulers to work on a new date/time. Patient must be five days out from positive result and symptoms free before having procedure. Verbalized understanding.     TO [facility] clerical.

## 2025-02-06 ENCOUNTER — TELEPHONE (OUTPATIENT)
Dept: CARDIOLOGY | Facility: CLINIC | Age: 70
End: 2025-02-06
Payer: MEDICARE

## 2025-02-06 NOTE — TELEPHONE ENCOUNTER
[facility] ( [phone]) after fax was received with responses. States question one and two were answered but they will needed #3 answered also regarding risk stratification and iv sedation.     To Dr. Kana Restrepo, DO

## 2025-04-09 ENCOUNTER — APPOINTMENT (OUTPATIENT)
Dept: CARDIOLOGY | Facility: CLINIC | Age: 70
End: 2025-04-09
Payer: MEDICARE

## 2025-04-17 ENCOUNTER — APPOINTMENT (OUTPATIENT)
Dept: CARDIOLOGY | Facility: CLINIC | Age: 70
End: 2025-04-17
Payer: MEDICARE

## 2025-04-17 VITALS
BODY MASS INDEX: 35.7 KG/M2 | DIASTOLIC BLOOD PRESSURE: 80 MMHG | SYSTOLIC BLOOD PRESSURE: 130 MMHG | WEIGHT: 241 LBS | HEIGHT: 69 IN | HEART RATE: 80 BPM

## 2025-04-17 DIAGNOSIS — I50.9 ACC/AHA STAGE C CONGESTIVE HEART FAILURE DUE TO ISCHEMIC CARDIOMYOPATHY: ICD-10-CM

## 2025-04-17 DIAGNOSIS — I21.19 INFERIOR MYOCARDIAL INFARCTION (MULTI): ICD-10-CM

## 2025-04-17 DIAGNOSIS — E78.2 MIXED HYPERLIPIDEMIA: ICD-10-CM

## 2025-04-17 DIAGNOSIS — I25.5 CARDIOMYOPATHY, ISCHEMIC: ICD-10-CM

## 2025-04-17 DIAGNOSIS — I10 ESSENTIAL HYPERTENSION: ICD-10-CM

## 2025-04-17 DIAGNOSIS — Z98.61 S/P PTCA (PERCUTANEOUS TRANSLUMINAL CORONARY ANGIOPLASTY): ICD-10-CM

## 2025-04-17 DIAGNOSIS — I25.3 ANEURYSM OF LEFT VENTRICLE OF HEART: ICD-10-CM

## 2025-04-17 DIAGNOSIS — R00.0 TACHYCARDIA DETERMINED BY EXAMINATION OF PULSE: ICD-10-CM

## 2025-04-17 DIAGNOSIS — Z87.891 FORMER SMOKER: ICD-10-CM

## 2025-04-17 DIAGNOSIS — I31.39 PERICARDIAL EFFUSION (HHS-HCC): ICD-10-CM

## 2025-04-17 DIAGNOSIS — I25.10 ASHD (ARTERIOSCLEROTIC HEART DISEASE): ICD-10-CM

## 2025-04-17 DIAGNOSIS — I25.5 ACC/AHA STAGE C CONGESTIVE HEART FAILURE DUE TO ISCHEMIC CARDIOMYOPATHY: ICD-10-CM

## 2025-04-17 PROCEDURE — 3008F BODY MASS INDEX DOCD: CPT | Performed by: INTERNAL MEDICINE

## 2025-04-17 PROCEDURE — 3079F DIAST BP 80-89 MM HG: CPT | Performed by: INTERNAL MEDICINE

## 2025-04-17 PROCEDURE — 99215 OFFICE O/P EST HI 40 MIN: CPT | Performed by: INTERNAL MEDICINE

## 2025-04-17 PROCEDURE — 3075F SYST BP GE 130 - 139MM HG: CPT | Performed by: INTERNAL MEDICINE

## 2025-04-17 PROCEDURE — 1159F MED LIST DOCD IN RCRD: CPT | Performed by: INTERNAL MEDICINE

## 2025-04-17 PROCEDURE — 1036F TOBACCO NON-USER: CPT | Performed by: INTERNAL MEDICINE

## 2025-04-17 ASSESSMENT — ENCOUNTER SYMPTOMS: SHORTNESS OF BREATH: 1

## 2025-04-17 NOTE — PATIENT INSTRUCTIONS
Please bring all medicines, vitamins, and herbal supplements with you when you come to the office.    Prescriptions will not be filled unless you are compliant with your follow up appointments or have a follow up appointment scheduled as per instruction of your physician. Refills should be requested at the time of your visit.     BMI was above normal measurement. Current weight: 109 kg (241 lb)  Weight change since last visit (-) denotes wt loss -3 lbs   Weight loss needed to achieve BMI 25: 74.5 Lbs  Weight loss needed to achieve BMI 30: 41.2 Lbs  Provided instructions on dietary changes  Provided instructions on exercise.

## 2025-04-17 NOTE — PROGRESS NOTES
Chief Complaint   Patient presents with    Follow-up     3 months for  arteriosclerotic heart disease       Subjective   Michael Jones is a 69 y.o. male     69-year-old gentleman returns for 3-month follow-up he is doing well he denies any cardiovascular events, complaints or nitrate usage or hospitalizations.  He is ambulating, independent, has had no heart failure issues or arrhythmias.    He is status post large inferior ST elevation MI with revascularization of the RCA x 3 drug-eluting stents in September 2024.  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  Because there was significant residual inferobasilar aneurysmal disease there was concern about post MI complication/contained rupture and therefore proceeded with further imaging.  MRI imaging reveals persistent severe LV dysfunction with ejection fraction of 32%,, 100% transmural infarction of the inferior basilar segment and nontransmural infarction of the inferoapical segment on the order of 50%.     Patient saw Dr. Martin Solis of cardiothoracic surgery in consultation who then discussed with me possible benefits of aneurysmectomy and the need for limited repeat cath to assure no further revascularization is necessary.  He also has poor dentition and will require removal of 10 teeth, has consultation with oral surgeon on January 28.    Patient cannot afford the orthodontic procedures for teeth extraction and therefore because of this cannot proceed with aneurysmectomy, and prefers conservative management at this juncture.  Similarly, he was unable to afford Entresto and Brilinta, we made accommodations pharmacologically.    Recommendations: Obtain chemistry and BNP panels, will proceed with Holter monitoring to assess for arrhythmia, and follow-up in 6 months           Review of Systems   Respiratory:  Positive for shortness of breath.    All other  "systems reviewed and are negative.           Vitals:    04/17/25 1117   BP: 130/80   BP Location: Left arm   Patient Position: Sitting   Pulse: 80   Weight: 109 kg (241 lb)   Height: 1.74 m (5' 8.5\")        Objective   Physical Exam  Constitutional:       Appearance: Normal appearance.   HENT:      Nose: Nose normal.   Neck:      Vascular: No carotid bruit.   Cardiovascular:      Rate and Rhythm: Normal rate.      Pulses: Normal pulses.      Heart sounds: Normal heart sounds.   Pulmonary:      Effort: Pulmonary effort is normal.   Abdominal:      General: Bowel sounds are normal.      Palpations: Abdomen is soft.   Musculoskeletal:         General: Normal range of motion.      Cervical back: Normal range of motion.      Right lower leg: No edema.      Left lower leg: No edema.   Skin:     General: Skin is warm and dry.   Neurological:      General: No focal deficit present.      Mental Status: He is alert.   Psychiatric:         Mood and Affect: Mood normal.         Behavior: Behavior normal.         Thought Content: Thought content normal.         Judgment: Judgment normal.         Allergies  Penicillins     Current Medications  Current Outpatient Medications   Medication Instructions    aspirin 81 mg, Daily    atorvastatin (LIPITOR) 80 mg, Daily    clopidogrel (PLAVIX) 75 mg, Daily    empagliflozin (JARDIANCE) 10 mg, Daily    losartan (COZAAR) 12.5 mg, oral, Daily    metoprolol succinate XL (TOPROL-XL) 50 mg, Daily    nitroglycerin (NITROSTAT) 0.4 mg, Every 5 min PRN                        Assessment/Plan   1. ASHD (arteriosclerotic heart disease)  Follow Up In Cardiology      2. Inferior myocardial infarction (Multi)        3. Aneurysm of left ventricle of heart        4. S/P PTCA (percutaneous transluminal coronary angioplasty)        5. Pericardial effusion (HHS-HCC)        6. Cardiomyopathy, ischemic        7. Mixed hyperlipidemia        8. BMI 36.0-36.9,adult        9. Former smoker                 Scribe " Attestation  By signing my name below, I, Rayna VALENZUELA RN   , Scribe   attest that this documentation has been prepared under the direction and in the presence of Kana Restrepo DO.     Provider Attestation - Scribe documentation    All medical record entries made by the Scribe were at my direction and personally dictated by me. I have reviewed the chart and agree that the record accurately reflects my personal performance of the history, physical exam, discussion and plan.

## 2025-04-17 NOTE — LETTER
April 17, 2025     Edelmira Castañeda DO  [address] [place] [address]    Patient: Michael Jones   YOB: 1955   Date of Visit: 4/17/2025       Dear Dr. Edelmira Castañeda DO:    Thank you for referring Michael Jones to me for evaluation. Below are my notes for this consultation.  If you have questions, please do not hesitate to call me. I look forward to following your patient along with you.       Sincerely,     Kana Restrepo DO      CC: No Recipients  ______________________________________________________________________________________    Chief Complaint   Patient presents with   • Follow-up     3 months for  arteriosclerotic heart disease       Subjective   Michael Jones is a 69 y.o. male     69-year-old gentleman returns for 3-month follow-up he is doing well he denies any cardiovascular events, complaints or nitrate usage or hospitalizations.  He is ambulating, independent, has had no heart failure issues or arrhythmias.    He is status post large inferior ST elevation MI with revascularization of the RCA x 3 drug-eluting stents in September 2024.  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  Because there was significant residual inferobasilar aneurysmal disease there was concern about post MI complication/contained rupture and therefore proceeded with further imaging.  MRI imaging reveals persistent severe LV dysfunction with ejection fraction of 32%,, 100% transmural infarction of the inferior basilar segment and nontransmural infarction of the inferoapical segment on the order of 50%.     Patient saw Dr. Martin Solis of cardiothoracic surgery in consultation who then discussed with me possible benefits of aneurysmectomy and the need for limited repeat cath to assure no further revascularization is necessary.  He also has poor dentition and will require removal of 10 teeth,  "has consultation with oral surgeon on January 28.    Patient cannot afford the orthodontic procedures for teeth extraction and therefore because of this cannot proceed with aneurysmectomy, and prefers conservative management at this juncture.  Similarly, he was unable to afford Entresto and Brilinta, we made accommodations pharmacologically.    Recommendations: Obtain chemistry and BNP panels, will proceed with Holter monitoring to assess for arrhythmia, and follow-up in 6 months           Review of Systems   Respiratory:  Positive for shortness of breath.    All other systems reviewed and are negative.           Vitals:    04/17/25 1117   BP: 130/80   BP Location: Left arm   Patient Position: Sitting   Pulse: 80   Weight: 109 kg (241 lb)   Height: 1.74 m (5' 8.5\")        Objective   Physical Exam  Constitutional:       Appearance: Normal appearance.   HENT:      Nose: Nose normal.   Neck:      Vascular: No carotid bruit.   Cardiovascular:      Rate and Rhythm: Normal rate.      Pulses: Normal pulses.      Heart sounds: Normal heart sounds.   Pulmonary:      Effort: Pulmonary effort is normal.   Abdominal:      General: Bowel sounds are normal.      Palpations: Abdomen is soft.   Musculoskeletal:         General: Normal range of motion.      Cervical back: Normal range of motion.      Right lower leg: No edema.      Left lower leg: No edema.   Skin:     General: Skin is warm and dry.   Neurological:      General: No focal deficit present.      Mental Status: He is alert.   Psychiatric:         Mood and Affect: Mood normal.         Behavior: Behavior normal.         Thought Content: Thought content normal.         Judgment: Judgment normal.         Allergies  Penicillins     Current Medications  Current Outpatient Medications   Medication Instructions   • aspirin 81 mg, Daily   • atorvastatin (LIPITOR) 80 mg, Daily   • clopidogrel (PLAVIX) 75 mg, Daily   • empagliflozin (JARDIANCE) 10 mg, Daily   • losartan (COZAAR) " 12.5 mg, oral, Daily   • metoprolol succinate XL (TOPROL-XL) 50 mg, Daily   • nitroglycerin (NITROSTAT) 0.4 mg, Every 5 min PRN                        Assessment/Plan   1. ASHD (arteriosclerotic heart disease)  Follow Up In Cardiology      2. Inferior myocardial infarction (Multi)        3. Aneurysm of left ventricle of heart        4. S/P PTCA (percutaneous transluminal coronary angioplasty)        5. Pericardial effusion ([facility]-H[facility])        6. Cardiomyopathy, ischemic        7. Mixed hyperlipidemia        8. BMI 36.0-36.9,adult        9. Former smoker                 Scribe Attestation  By signing my name below, I, Rayna VALENZUELA RN   , Scribe   attest that this documentation has been prepared under the direction and in the presence of Kana Restrepo DO.     Provider Attestation - Scribe documentation    All medical record entries made by the Scribe were at my direction and personally dictated by me. I have reviewed the chart and agree that the record accurately reflects my personal performance of the history, physical exam, discussion and plan.

## 2025-04-22 ENCOUNTER — APPOINTMENT (OUTPATIENT)
Dept: CARDIOLOGY | Facility: CLINIC | Age: 70
End: 2025-04-22
Payer: MEDICARE

## 2025-04-22 DIAGNOSIS — I25.5 CARDIOMYOPATHY, ISCHEMIC: ICD-10-CM

## 2025-04-22 DIAGNOSIS — I25.10 ASHD (ARTERIOSCLEROTIC HEART DISEASE): ICD-10-CM

## 2025-04-22 DIAGNOSIS — R00.0 TACHYCARDIA DETERMINED BY EXAMINATION OF PULSE: ICD-10-CM

## 2025-04-22 DIAGNOSIS — I21.19 INFERIOR MYOCARDIAL INFARCTION (MULTI): ICD-10-CM

## 2025-04-23 PROCEDURE — 93227 XTRNL ECG REC<48 HR R&I: CPT | Performed by: INTERNAL MEDICINE

## 2025-05-08 LAB
NON-UH HIE ANION GAP: 10.8 MEQ/L (ref 6–15)
NON-UH HIE B-TYPE NATRIURETIC PEPTIDE: 162 PG/ML (ref 5–100)
NON-UH HIE BLOOD UREA NITROGEN: 13 MG/DL (ref 7–25)
NON-UH HIE CALCIUM: 9.3 MG/DL (ref 8.6–10.3)
NON-UH HIE CARBON DIOXIDE: 29.5 MMOL/L (ref 21–31)
NON-UH HIE CHLORIDE: 103 MMOL/L (ref 98–107)
NON-UH HIE CREATININE: 1.06 MG/DL (ref 0.7–1.3)
NON-UH HIE ESTIMATED GFR: >60 ML/MIN
NON-UH HIE GLUCOSE: 111 MG/DL (ref 70–100)
NON-UH HIE POTASSIUM: 4.3 MMOL/L (ref 3.5–5.1)
NON-UH HIE SODIUM: 139 MMOL/L (ref 136–145)

## 2025-05-13 ENCOUNTER — TELEPHONE (OUTPATIENT)
Dept: CARDIOLOGY | Facility: CLINIC | Age: 70
End: 2025-05-13
Payer: MEDICARE

## 2025-05-13 NOTE — TELEPHONE ENCOUNTER
Result Communication    Resulted Orders   Holter Or Event Cardiac Monitor    Narrative    This is a 24-hour Holter monitor    Indication tachycardia    Referring physician is Dr. Restrepo    Procedure patient underwent 24-hour Holter monitor.  Baseline rhythm is   normal sinus rhythm with average heart rate of 77 bpm ranging from 50 to   111 bpm.  There were 88 isolated ventricular ectopic beats and 64 isolated   supraventricular ectopic beats.  There was no significant tacky or   bradycardia arrhythmia.  There was no pauses or bradycardic episode.  No   symptoms entered by the patient.    Conclusion   Normal sinus rhythm  2.  Occasional isolated ventricular and supraventricular ectopic beat  3.  No significant tacky or bradycardia arrhythmia  4.  No symptoms entered by the patient         9:57 AM      Results were not successfully communicated with the patient and they did not acknowledge their understanding.

## 2025-05-13 NOTE — TELEPHONE ENCOUNTER
----- Message from Kana Restrepo sent at 5/12/2025  3:14 PM EDT -----  No new orders. Please call patient with normal result.  ----- Message -----  From: Berry Mclain MD  Sent: 4/23/2025   4:37 PM EDT  To: Kana Restrepo, DO

## 2025-05-14 ENCOUNTER — TELEPHONE (OUTPATIENT)
Dept: CARDIOLOGY | Facility: CLINIC | Age: 70
End: 2025-05-14
Payer: MEDICARE

## 2025-05-14 NOTE — TELEPHONE ENCOUNTER
Result Communication    Resulted Orders   [facility] Basic Metabolic Panel   Result Value Ref Range    [facility] Glucose 111 (H) 70 - 100 mg/dL      Comment:         Random Glucose Reference Range is dependent on time and   content of last meal. Glucose of more than 200 mg/dL in a   nonstressed, ambulatory subject supports the diagnosis of   Diabetes Mellitus.   ADA recommended reference range    [facility] Blood Urea Nitrogen 13 7 - 25 mg/dL    [facility] Creatinine 1.06 0.70 - 1.30 mg/dL    [facility] ESTIMATED GFR >60.0 mL/Min    [facility] Sodium 139 136 - 145 mmol/L    [facility] Potassium 4.3 3.5 - 5.1 mmol/L    [facility] Chloride 103 98 - 107 mmol/L    [facility] Carbon Dioxide 29.5 21.0 - 31.0 mmol/L    [facility] Anion Gap 10.8 6.0 - 15.0 meq/L    [facility] Calcium 9.3 8.6 - 10.3 mg/dL      Comment:      PERFORMED BY:[facility] [address] [address] [place], [place] [id number] MEDICAL DIRECTORNURY CAMPBELL M.D.   [facility] B-Type Natriuretic Peptide   Result Value Ref Range    [facility] B-Type Natriuretic Peptide 162.0 (H) 5 - 100 pg/mL      Comment:      PERFORMED BY:[facility] [address] [address] [place], [place] [id number] MEDICAL DIRECTORNURY CAMPBELL M.D.       3:44 PM

## 2025-05-14 NOTE — TELEPHONE ENCOUNTER
----- Message from Kana Restrepo sent at 5/14/2025  1:08 PM EDT -----  No new orders. Please call patient with normal result.  ----- Message -----  From: Vito Bess - Lab Results In  Sent: 5/13/2025   2:08 PM EDT  To: Kana Restrepo, DO

## 2025-08-11 DIAGNOSIS — I25.10 ASHD (ARTERIOSCLEROTIC HEART DISEASE): ICD-10-CM

## 2025-08-11 DIAGNOSIS — I25.5 CARDIOMYOPATHY, ISCHEMIC: ICD-10-CM

## 2025-08-12 RX ORDER — METOPROLOL SUCCINATE 50 MG/1
50 TABLET, EXTENDED RELEASE ORAL
Qty: 90 TABLET | Refills: 3 | Status: SHIPPED | OUTPATIENT
Start: 2025-08-12

## 2025-08-12 RX ORDER — CLOPIDOGREL BISULFATE 75 MG/1
75 TABLET ORAL
Qty: 90 TABLET | Refills: 3 | Status: SHIPPED | OUTPATIENT
Start: 2025-08-12

## 2025-10-14 ENCOUNTER — APPOINTMENT (OUTPATIENT)
Dept: CARDIOLOGY | Facility: CLINIC | Age: 70
End: 2025-10-14
Payer: MEDICARE

## 2025-10-30 ENCOUNTER — APPOINTMENT (OUTPATIENT)
Dept: CARDIOLOGY | Facility: CLINIC | Age: 70
End: 2025-10-30
Payer: MEDICARE